# Patient Record
Sex: MALE | Race: WHITE
[De-identification: names, ages, dates, MRNs, and addresses within clinical notes are randomized per-mention and may not be internally consistent; named-entity substitution may affect disease eponyms.]

---

## 2018-04-05 ENCOUNTER — HOSPITAL ENCOUNTER (EMERGENCY)
Dept: HOSPITAL 95 - ER | Age: 53
Discharge: HOME | End: 2018-04-05
Payer: COMMERCIAL

## 2018-04-05 VITALS — WEIGHT: 178 LBS | HEIGHT: 69 IN | BODY MASS INDEX: 26.36 KG/M2

## 2018-04-05 DIAGNOSIS — Z88.5: ICD-10-CM

## 2018-04-05 DIAGNOSIS — E78.00: ICD-10-CM

## 2018-04-05 DIAGNOSIS — M54.5: ICD-10-CM

## 2018-04-05 DIAGNOSIS — Z79.899: ICD-10-CM

## 2018-04-05 DIAGNOSIS — Z88.8: ICD-10-CM

## 2018-04-05 DIAGNOSIS — E11.9: ICD-10-CM

## 2018-04-05 DIAGNOSIS — G89.29: Primary | ICD-10-CM

## 2018-04-05 DIAGNOSIS — Z88.1: ICD-10-CM

## 2018-04-05 DIAGNOSIS — Z87.891: ICD-10-CM

## 2018-04-05 DIAGNOSIS — Z79.4: ICD-10-CM

## 2018-04-21 ENCOUNTER — HOSPITAL ENCOUNTER (OUTPATIENT)
Dept: HOSPITAL 95 - LAB SHORT | Age: 53
End: 2018-04-21
Attending: PHYSICIAN ASSISTANT
Payer: COMMERCIAL

## 2018-04-21 DIAGNOSIS — L08.9: Primary | ICD-10-CM

## 2018-05-08 ENCOUNTER — HOSPITAL ENCOUNTER (INPATIENT)
Dept: HOSPITAL 95 - ER | Age: 53
LOS: 3 days | Discharge: HOME | DRG: 617 | End: 2018-05-11
Attending: HOSPITALIST | Admitting: HOSPITALIST
Payer: COMMERCIAL

## 2018-05-08 VITALS — HEIGHT: 69.02 IN | BODY MASS INDEX: 29.36 KG/M2 | WEIGHT: 198.2 LBS

## 2018-05-08 DIAGNOSIS — I10: ICD-10-CM

## 2018-05-08 DIAGNOSIS — M86.9: ICD-10-CM

## 2018-05-08 DIAGNOSIS — Z79.4: ICD-10-CM

## 2018-05-08 DIAGNOSIS — Z89.411: ICD-10-CM

## 2018-05-08 DIAGNOSIS — E78.5: ICD-10-CM

## 2018-05-08 DIAGNOSIS — E11.622: ICD-10-CM

## 2018-05-08 DIAGNOSIS — E11.42: ICD-10-CM

## 2018-05-08 DIAGNOSIS — K21.9: ICD-10-CM

## 2018-05-08 DIAGNOSIS — E11.69: Primary | ICD-10-CM

## 2018-05-08 DIAGNOSIS — N17.9: ICD-10-CM

## 2018-05-08 LAB
ALBUMIN SERPL BCP-MCNC: 3.6 G/DL (ref 3.4–5)
ALBUMIN/GLOB SERPL: 1 {RATIO} (ref 0.8–1.8)
ALT SERPL W P-5'-P-CCNC: 24 U/L (ref 12–78)
ANION GAP SERPL CALCULATED.4IONS-SCNC: 8 MMOL/L (ref 6–16)
AST SERPL W P-5'-P-CCNC: 19 U/L (ref 12–37)
BASOPHILS # BLD AUTO: 0.04 K/MM3 (ref 0–0.23)
BASOPHILS NFR BLD AUTO: 1 % (ref 0–2)
BILIRUB SERPL-MCNC: 0.3 MG/DL (ref 0.1–1)
BUN SERPL-MCNC: 48 MG/DL (ref 8–24)
CALCIUM SERPL-MCNC: 8.5 MG/DL (ref 8.5–10.1)
CHLORIDE SERPL-SCNC: 110 MMOL/L (ref 98–108)
CO2 SERPL-SCNC: 21 MMOL/L (ref 21–32)
CREAT SERPL-MCNC: 2.13 MG/DL (ref 0.6–1.2)
DEPRECATED RDW RBC AUTO: 39.7 FL (ref 35.1–46.3)
EOSINOPHIL # BLD AUTO: 0.15 K/MM3 (ref 0–0.68)
EOSINOPHIL NFR BLD AUTO: 2 % (ref 0–6)
ERYTHROCYTE [DISTWIDTH] IN BLOOD BY AUTOMATED COUNT: 12.5 % (ref 11.7–14.2)
GLOBULIN SER CALC-MCNC: 3.6 G/DL (ref 2.2–4)
GLUCOSE SERPL-MCNC: 220 MG/DL (ref 70–99)
HCT VFR BLD AUTO: 29.6 % (ref 37–53)
HGB BLD-MCNC: 9.8 G/DL (ref 13.5–17.5)
IMM GRANULOCYTES # BLD AUTO: 0.02 K/MM3 (ref 0–0.1)
IMM GRANULOCYTES NFR BLD AUTO: 0 % (ref 0–1)
LYMPHOCYTES # BLD AUTO: 1.05 K/MM3 (ref 0.84–5.2)
LYMPHOCYTES NFR BLD AUTO: 14 % (ref 21–46)
MCHC RBC AUTO-ENTMCNC: 33.1 G/DL (ref 31.5–36.5)
MCV RBC AUTO: 87 FL (ref 80–100)
MONOCYTES # BLD AUTO: 0.56 K/MM3 (ref 0.16–1.47)
MONOCYTES NFR BLD AUTO: 8 % (ref 4–13)
NEUTROPHILS # BLD AUTO: 5.61 K/MM3 (ref 1.96–9.15)
NEUTROPHILS NFR BLD AUTO: 76 % (ref 41–73)
NRBC # BLD AUTO: 0 K/MM3 (ref 0–0.02)
NRBC BLD AUTO-RTO: 0 /100 WBC (ref 0–0.2)
PLATELET # BLD AUTO: 256 K/MM3 (ref 150–400)
POTASSIUM SERPL-SCNC: 5.3 MMOL/L (ref 3.5–5.5)
PROT SERPL-MCNC: 7.2 G/DL (ref 6.4–8.2)
SODIUM SERPL-SCNC: 139 MMOL/L (ref 136–145)

## 2018-05-09 LAB
ANION GAP SERPL CALCULATED.4IONS-SCNC: 7 MMOL/L (ref 6–16)
BUN SERPL-MCNC: 43 MG/DL (ref 8–24)
CALCIUM SERPL-MCNC: 7.8 MG/DL (ref 8.5–10.1)
CHLORIDE SERPL-SCNC: 111 MMOL/L (ref 98–108)
CO2 SERPL-SCNC: 21 MMOL/L (ref 21–32)
CREAT SERPL-MCNC: 1.63 MG/DL (ref 0.6–1.2)
DEPRECATED RDW RBC AUTO: 39.5 FL (ref 35.1–46.3)
EOSINOPHIL #/AREA URNS HPF: 0 /[HPF]
EOSINOPHIL NFR URNS MANUAL: 0 % (ref 0–1)
ERYTHROCYTE [DISTWIDTH] IN BLOOD BY AUTOMATED COUNT: 12.4 % (ref 11.7–14.2)
GLUCOSE SERPL-MCNC: 197 MG/DL (ref 70–99)
HCT VFR BLD AUTO: 25.9 % (ref 37–53)
HGB BLD-MCNC: 8.6 G/DL (ref 13.5–17.5)
MCHC RBC AUTO-ENTMCNC: 33.2 G/DL (ref 31.5–36.5)
MCV RBC AUTO: 86 FL (ref 80–100)
NRBC # BLD AUTO: 0 K/MM3 (ref 0–0.02)
NRBC BLD AUTO-RTO: 0 /100 WBC (ref 0–0.2)
PLATELET # BLD AUTO: 194 K/MM3 (ref 150–400)
POTASSIUM SERPL-SCNC: 5.1 MMOL/L (ref 3.5–5.5)
SODIUM SERPL-SCNC: 139 MMOL/L (ref 136–145)
WBC #/AREA URNS HPF: (no result) /HPF (ref 0–5)

## 2018-05-10 LAB
ANION GAP SERPL CALCULATED.4IONS-SCNC: 6 MMOL/L (ref 6–16)
BASOPHILS # BLD AUTO: 0.05 K/MM3 (ref 0–0.23)
BASOPHILS NFR BLD AUTO: 1 % (ref 0–2)
BUN SERPL-MCNC: 25 MG/DL (ref 8–24)
CALCIUM SERPL-MCNC: 8.4 MG/DL (ref 8.5–10.1)
CHLORIDE SERPL-SCNC: 112 MMOL/L (ref 98–108)
CO2 SERPL-SCNC: 23 MMOL/L (ref 21–32)
CREAT SERPL-MCNC: 1.04 MG/DL (ref 0.6–1.2)
DEPRECATED RDW RBC AUTO: 37.7 FL (ref 35.1–46.3)
EOSINOPHIL # BLD AUTO: 0.19 K/MM3 (ref 0–0.68)
EOSINOPHIL NFR BLD AUTO: 3 % (ref 0–6)
ERYTHROCYTE [DISTWIDTH] IN BLOOD BY AUTOMATED COUNT: 12.2 % (ref 11.7–14.2)
GLUCOSE SERPL-MCNC: 216 MG/DL (ref 70–99)
HCT VFR BLD AUTO: 28.8 % (ref 37–53)
HGB BLD-MCNC: 9.8 G/DL (ref 13.5–17.5)
IMM GRANULOCYTES # BLD AUTO: 0.01 K/MM3 (ref 0–0.1)
IMM GRANULOCYTES NFR BLD AUTO: 0 % (ref 0–1)
LYMPHOCYTES # BLD AUTO: 1.1 K/MM3 (ref 0.84–5.2)
LYMPHOCYTES NFR BLD AUTO: 19 % (ref 21–46)
MCHC RBC AUTO-ENTMCNC: 34 G/DL (ref 31.5–36.5)
MCV RBC AUTO: 85 FL (ref 80–100)
MONOCYTES # BLD AUTO: 0.5 K/MM3 (ref 0.16–1.47)
MONOCYTES NFR BLD AUTO: 9 % (ref 4–13)
NEUTROPHILS # BLD AUTO: 4.05 K/MM3 (ref 1.96–9.15)
NEUTROPHILS NFR BLD AUTO: 69 % (ref 41–73)
NRBC # BLD AUTO: 0 K/MM3 (ref 0–0.02)
NRBC BLD AUTO-RTO: 0 /100 WBC (ref 0–0.2)
PLATELET # BLD AUTO: 187 K/MM3 (ref 150–400)
POTASSIUM SERPL-SCNC: 4.9 MMOL/L (ref 3.5–5.5)
SODIUM SERPL-SCNC: 141 MMOL/L (ref 136–145)

## 2018-05-10 PROCEDURE — 0Y6R0Z0 DETACHMENT AT RIGHT 2ND TOE, COMPLETE, OPEN APPROACH: ICD-10-PCS | Performed by: PODIATRIST

## 2018-05-10 PROCEDURE — 0JBQ0ZZ EXCISION OF RIGHT FOOT SUBCUTANEOUS TISSUE AND FASCIA, OPEN APPROACH: ICD-10-PCS | Performed by: PODIATRIST

## 2018-05-11 LAB
DEPRECATED RDW RBC AUTO: 37.5 FL (ref 35.1–46.3)
ERYTHROCYTE [DISTWIDTH] IN BLOOD BY AUTOMATED COUNT: 12.3 % (ref 11.7–14.2)
HCT VFR BLD AUTO: 28.3 % (ref 37–53)
HGB BLD-MCNC: 9.6 G/DL (ref 13.5–17.5)
MCHC RBC AUTO-ENTMCNC: 33.9 G/DL (ref 31.5–36.5)
MCV RBC AUTO: 84 FL (ref 80–100)
NRBC # BLD AUTO: 0 K/MM3 (ref 0–0.02)
NRBC BLD AUTO-RTO: 0 /100 WBC (ref 0–0.2)
PLATELET # BLD AUTO: 225 K/MM3 (ref 150–400)

## 2018-06-20 ENCOUNTER — HOSPITAL ENCOUNTER (OUTPATIENT)
Dept: HOSPITAL 95 - LAB EV | Age: 53
Discharge: HOME | End: 2018-06-20
Attending: PHYSICIAN ASSISTANT
Payer: COMMERCIAL

## 2018-06-20 DIAGNOSIS — R07.0: Primary | ICD-10-CM

## 2018-12-25 ENCOUNTER — HOSPITAL ENCOUNTER (EMERGENCY)
Dept: HOSPITAL 95 - ER | Age: 53
Discharge: HOME | End: 2018-12-25
Payer: COMMERCIAL

## 2018-12-25 VITALS — HEIGHT: 69 IN | BODY MASS INDEX: 28.14 KG/M2 | WEIGHT: 189.99 LBS

## 2018-12-25 DIAGNOSIS — Z79.4: ICD-10-CM

## 2018-12-25 DIAGNOSIS — E78.00: ICD-10-CM

## 2018-12-25 DIAGNOSIS — E11.9: ICD-10-CM

## 2018-12-25 DIAGNOSIS — Z79.899: ICD-10-CM

## 2018-12-25 DIAGNOSIS — I10: ICD-10-CM

## 2018-12-25 DIAGNOSIS — Z91.018: ICD-10-CM

## 2018-12-25 DIAGNOSIS — I16.0: Primary | ICD-10-CM

## 2018-12-25 DIAGNOSIS — Z87.891: ICD-10-CM

## 2019-02-26 ENCOUNTER — HOSPITAL ENCOUNTER (EMERGENCY)
Dept: HOSPITAL 95 - ER | Age: 54
Discharge: HOME | End: 2019-02-26
Payer: COMMERCIAL

## 2019-02-26 ENCOUNTER — HOSPITAL ENCOUNTER (EMERGENCY)
Dept: HOSPITAL 95 - ER | Age: 54
Discharge: LEFT BEFORE DISCHARGE | End: 2019-02-26
Payer: COMMERCIAL

## 2019-02-26 VITALS — HEIGHT: 66 IN | BODY MASS INDEX: 29.73 KG/M2 | WEIGHT: 184.99 LBS

## 2019-02-26 VITALS — WEIGHT: 184.99 LBS | HEIGHT: 66 IN | BODY MASS INDEX: 29.73 KG/M2

## 2019-02-26 DIAGNOSIS — Z91.018: ICD-10-CM

## 2019-02-26 DIAGNOSIS — Z88.5: ICD-10-CM

## 2019-02-26 DIAGNOSIS — R41.0: ICD-10-CM

## 2019-02-26 DIAGNOSIS — Z88.1: ICD-10-CM

## 2019-02-26 DIAGNOSIS — Z79.4: ICD-10-CM

## 2019-02-26 DIAGNOSIS — I10: ICD-10-CM

## 2019-02-26 DIAGNOSIS — Z79.899: ICD-10-CM

## 2019-02-26 DIAGNOSIS — E78.00: ICD-10-CM

## 2019-02-26 DIAGNOSIS — Z87.891: ICD-10-CM

## 2019-02-26 DIAGNOSIS — E11.9: ICD-10-CM

## 2019-02-26 DIAGNOSIS — Z88.8: ICD-10-CM

## 2019-02-26 DIAGNOSIS — R51: Primary | ICD-10-CM

## 2019-02-26 DIAGNOSIS — Z53.21: ICD-10-CM

## 2019-02-26 LAB
ALBUMIN SERPL BCP-MCNC: 2.6 G/DL (ref 3.4–5)
ALBUMIN/GLOB SERPL: 0.7 {RATIO} (ref 0.8–1.8)
ALT SERPL W P-5'-P-CCNC: 18 U/L (ref 12–78)
ANION GAP SERPL CALCULATED.4IONS-SCNC: 7 MMOL/L (ref 6–16)
AST SERPL W P-5'-P-CCNC: 18 U/L (ref 12–37)
BASOPHILS # BLD AUTO: 0.06 K/MM3 (ref 0–0.23)
BASOPHILS NFR BLD AUTO: 1 % (ref 0–2)
BILIRUB SERPL-MCNC: 0.3 MG/DL (ref 0.1–1)
BUN SERPL-MCNC: 43 MG/DL (ref 8–24)
CALCIUM SERPL-MCNC: 8.7 MG/DL (ref 8.5–10.1)
CHLORIDE SERPL-SCNC: 112 MMOL/L (ref 98–108)
CO2 SERPL-SCNC: 24 MMOL/L (ref 21–32)
CREAT SERPL-MCNC: 2.62 MG/DL (ref 0.6–1.2)
DEPRECATED RDW RBC AUTO: 41.6 FL (ref 35.1–46.3)
EOSINOPHIL # BLD AUTO: 0.13 K/MM3 (ref 0–0.68)
EOSINOPHIL NFR BLD AUTO: 3 % (ref 0–6)
ERYTHROCYTE [DISTWIDTH] IN BLOOD BY AUTOMATED COUNT: 13.6 % (ref 11.7–14.2)
GLOBULIN SER CALC-MCNC: 3.7 G/DL (ref 2.2–4)
GLUCOSE SERPL-MCNC: 261 MG/DL (ref 70–99)
HCT VFR BLD AUTO: 32.7 % (ref 37–53)
HGB BLD-MCNC: 10.7 G/DL (ref 13.5–17.5)
IMM GRANULOCYTES # BLD AUTO: 0.01 K/MM3 (ref 0–0.1)
IMM GRANULOCYTES NFR BLD AUTO: 0 % (ref 0–1)
LYMPHOCYTES # BLD AUTO: 1.03 K/MM3 (ref 0.84–5.2)
LYMPHOCYTES NFR BLD AUTO: 20 % (ref 21–46)
MCHC RBC AUTO-ENTMCNC: 32.7 G/DL (ref 31.5–36.5)
MCV RBC AUTO: 84 FL (ref 80–100)
MONOCYTES # BLD AUTO: 0.28 K/MM3 (ref 0.16–1.47)
MONOCYTES NFR BLD AUTO: 5 % (ref 4–13)
NEUTROPHILS # BLD AUTO: 3.69 K/MM3 (ref 1.96–9.15)
NEUTROPHILS NFR BLD AUTO: 71 % (ref 41–73)
NRBC # BLD AUTO: 0 K/MM3 (ref 0–0.02)
NRBC BLD AUTO-RTO: 0 /100 WBC (ref 0–0.2)
PLATELET # BLD AUTO: 232 K/MM3 (ref 150–400)
POTASSIUM SERPL-SCNC: 4.6 MMOL/L (ref 3.5–5.5)
PROT SERPL-MCNC: 6.3 G/DL (ref 6.4–8.2)
PROTHROMBIN TIME: 10.1 SEC (ref 9.7–11.5)
SODIUM SERPL-SCNC: 143 MMOL/L (ref 136–145)

## 2019-04-23 ENCOUNTER — HOSPITAL ENCOUNTER (OUTPATIENT)
Dept: HOSPITAL 95 - LAB EV | Age: 54
Discharge: HOME | End: 2019-04-23
Attending: PHYSICIAN ASSISTANT
Payer: COMMERCIAL

## 2019-04-23 ENCOUNTER — HOSPITAL ENCOUNTER (INPATIENT)
Dept: HOSPITAL 95 - ER | Age: 54
LOS: 7 days | Discharge: HOME | DRG: 682 | End: 2019-04-30
Attending: HOSPITALIST | Admitting: HOSPITALIST
Payer: COMMERCIAL

## 2019-04-23 VITALS — WEIGHT: 181.88 LBS | BODY MASS INDEX: 26.94 KG/M2 | HEIGHT: 69.02 IN

## 2019-04-23 DIAGNOSIS — M86.671: ICD-10-CM

## 2019-04-23 DIAGNOSIS — Z87.891: ICD-10-CM

## 2019-04-23 DIAGNOSIS — R73.9: Primary | ICD-10-CM

## 2019-04-23 DIAGNOSIS — N18.3: ICD-10-CM

## 2019-04-23 DIAGNOSIS — E87.1: ICD-10-CM

## 2019-04-23 DIAGNOSIS — Z79.82: ICD-10-CM

## 2019-04-23 DIAGNOSIS — E11.42: ICD-10-CM

## 2019-04-23 DIAGNOSIS — G92: ICD-10-CM

## 2019-04-23 DIAGNOSIS — E78.5: ICD-10-CM

## 2019-04-23 DIAGNOSIS — D64.9: ICD-10-CM

## 2019-04-23 DIAGNOSIS — E86.0: ICD-10-CM

## 2019-04-23 DIAGNOSIS — E11.22: ICD-10-CM

## 2019-04-23 DIAGNOSIS — E86.1: ICD-10-CM

## 2019-04-23 DIAGNOSIS — N17.9: Primary | ICD-10-CM

## 2019-04-23 DIAGNOSIS — E87.3: ICD-10-CM

## 2019-04-23 DIAGNOSIS — I12.9: ICD-10-CM

## 2019-04-23 DIAGNOSIS — E87.5: ICD-10-CM

## 2019-04-23 DIAGNOSIS — Z79.4: ICD-10-CM

## 2019-04-23 DIAGNOSIS — L97.519: ICD-10-CM

## 2019-04-23 DIAGNOSIS — H93.8X3: ICD-10-CM

## 2019-04-23 DIAGNOSIS — K21.9: ICD-10-CM

## 2019-04-23 DIAGNOSIS — I80.8: ICD-10-CM

## 2019-04-23 DIAGNOSIS — E83.39: ICD-10-CM

## 2019-04-23 DIAGNOSIS — R20.2: ICD-10-CM

## 2019-04-23 DIAGNOSIS — E11.621: ICD-10-CM

## 2019-04-23 DIAGNOSIS — E11.65: ICD-10-CM

## 2019-04-23 LAB
ALBUMIN SERPL BCP-MCNC: 2.8 G/DL (ref 3.4–5)
ALBUMIN/GLOB SERPL: 0.7 {RATIO} (ref 0.8–1.8)
ALT SERPL W P-5'-P-CCNC: 23 U/L (ref 12–78)
ANION GAP SERPL CALCULATED.4IONS-SCNC: 7 MMOL/L
ANION GAP SERPL CALCULATED.4IONS-SCNC: 7 MMOL/L
ANION GAP SERPL CALCULATED.4IONS-SCNC: 9 MMOL/L (ref 6–16)
AST SERPL W P-5'-P-CCNC: 13 U/L (ref 12–37)
BASOPHILS # BLD AUTO: 0.1 K/MM3 (ref 0–0.23)
BASOPHILS NFR BLD AUTO: 1 % (ref 0–2)
BILIRUB SERPL-MCNC: 0.3 MG/DL (ref 0.1–1)
BUN SERPL-MCNC: 71 MG/DL
BUN SERPL-MCNC: 74 MG/DL
BUN SERPL-MCNC: 78 MG/DL (ref 8–24)
CALCIUM SERPL-MCNC: 8.2 MG/DL
CALCIUM SERPL-MCNC: 8.4 MG/DL (ref 8.5–10.1)
CALCIUM SERPL-MCNC: 8.5 MG/DL
CHLORIDE SERPL-SCNC: 103 MMOL/L (ref 98–108)
CHLORIDE SERPL-SCNC: 112 MMOL/L
CHLORIDE SERPL-SCNC: 113 MMOL/L
CO2 SERPL-SCNC: 20 MMOL/L
CO2 SERPL-SCNC: 20 MMOL/L
CO2 SERPL-SCNC: 20 MMOL/L (ref 21–32)
CREAT SERPL-MCNC: 3.69 MG/DL
CREAT SERPL-MCNC: 3.76 MG/DL
CREAT SERPL-MCNC: 4.14 MG/DL (ref 0.6–1.2)
CREAT UR-MCNC: 65.9 MG/DL
DEPRECATED RDW RBC AUTO: 40.3 FL (ref 35.1–46.3)
EOSINOPHIL # BLD AUTO: 0.22 K/MM3 (ref 0–0.68)
EOSINOPHIL NFR BLD AUTO: 3 % (ref 0–6)
ERYTHROCYTE [DISTWIDTH] IN BLOOD BY AUTOMATED COUNT: 13.1 % (ref 11.7–14.2)
GLOBULIN SER CALC-MCNC: 4.2 G/DL (ref 2.2–4)
GLUCOSE SERPL-MCNC: 214 MG/DL
GLUCOSE SERPL-MCNC: 251 MG/DL
GLUCOSE SERPL-MCNC: 354 MG/DL (ref 70–99)
GLUCOSE UR-MCNC: (no result) MG/DL
HCT VFR BLD AUTO: 30.8 % (ref 37–53)
HGB BLD-MCNC: 10.7 G/DL (ref 13.5–17.5)
IMM GRANULOCYTES # BLD AUTO: 0.07 K/MM3 (ref 0–0.1)
IMM GRANULOCYTES NFR BLD AUTO: 1 % (ref 0–1)
LYMPHOCYTES # BLD AUTO: 1.5 K/MM3 (ref 0.84–5.2)
LYMPHOCYTES NFR BLD AUTO: 20 % (ref 21–46)
MCHC RBC AUTO-ENTMCNC: 34.7 G/DL (ref 31.5–36.5)
MCV RBC AUTO: 84 FL (ref 80–100)
MONOCYTES # BLD AUTO: 0.43 K/MM3 (ref 0.16–1.47)
MONOCYTES NFR BLD AUTO: 6 % (ref 4–13)
NEUTROPHILS # BLD AUTO: 5.15 K/MM3 (ref 1.96–9.15)
NEUTROPHILS NFR BLD AUTO: 69 % (ref 41–73)
NRBC # BLD AUTO: 0 K/MM3 (ref 0–0.02)
NRBC BLD AUTO-RTO: 0 /100 WBC (ref 0–0.2)
PH BLDV: 7.24 [PH]
PLATELET # BLD AUTO: 331 K/MM3 (ref 150–400)
POTASSIUM SERPL-SCNC: 4.9 MMOL/L
POTASSIUM SERPL-SCNC: 5.2 MMOL/L
POTASSIUM SERPL-SCNC: 6.5 MMOL/L (ref 3.5–5.5)
PROT SERPL-MCNC: 7 G/DL (ref 6.4–8.2)
PROT UR STRIP-MCNC: (no result) MG/DL
RBC #/AREA URNS HPF: (no result) /HPF
SODIUM SERPL-SCNC: 132 MMOL/L (ref 136–145)
SODIUM SERPL-SCNC: 139 MMOL/L
SODIUM SERPL-SCNC: 140 MMOL/L
SODIUM UR-SCNC: 51 MMOL/L
SP GR SPEC: 1.01
TROPONIN I SERPL-MCNC: <0.015 NG/ML
UROBILINOGEN UR STRIP-MCNC: (no result) MG/DL
WBC #/AREA URNS HPF: (no result) /HPF

## 2019-04-24 LAB
ANION GAP SERPL CALCULATED.4IONS-SCNC: 6 MMOL/L
ANION GAP SERPL CALCULATED.4IONS-SCNC: 7 MMOL/L
BUN SERPL-MCNC: 68 MG/DL
BUN SERPL-MCNC: 72 MG/DL
CALCIUM SERPL-MCNC: 7.8 MG/DL
CALCIUM SERPL-MCNC: 8.1 MG/DL
CHLORIDE SERPL-SCNC: 112 MMOL/L
CHLORIDE SERPL-SCNC: 115 MMOL/L
CO2 SERPL-SCNC: 20 MMOL/L
CO2 SERPL-SCNC: 21 MMOL/L
CREAT SERPL-MCNC: 3.55 MG/DL
CREAT SERPL-MCNC: 3.58 MG/DL
DEPRECATED RDW RBC AUTO: 40.6 FL
ERYTHROCYTE [DISTWIDTH] IN BLOOD BY AUTOMATED COUNT: 13.1 %
GLUCOSE SERPL-MCNC: 139 MG/DL
GLUCOSE SERPL-MCNC: 224 MG/DL
HCT VFR BLD AUTO: 26.8 %
HGB BLD-MCNC: 8.9 G/DL
MAGNESIUM SERPL-MCNC: 2.5 MG/DL
MCHC RBC AUTO-ENTMCNC: 33.2 G/DL
MCV RBC AUTO: 86 FL
NRBC # BLD AUTO: 0 K/MM3
NRBC BLD AUTO-RTO: 0 /100 WBC
PH BLDV: 7.41 [PH]
PLATELET # BLD AUTO: 239 K/MM3
POTASSIUM SERPL-SCNC: 4.7 MMOL/L
POTASSIUM SERPL-SCNC: 4.9 MMOL/L
SODIUM SERPL-SCNC: 139 MMOL/L
SODIUM SERPL-SCNC: 142 MMOL/L

## 2019-04-25 LAB
ALBUMIN SERPL BCP-MCNC: 2 G/DL
ANION GAP SERPL CALCULATED.4IONS-SCNC: 5 MMOL/L
BASOPHILS # BLD AUTO: 0.06 K/MM3
BASOPHILS NFR BLD AUTO: 1 %
BUN SERPL-MCNC: 68 MG/DL
CALCIUM SERPL-MCNC: 7.8 MG/DL
CHLORIDE SERPL-SCNC: 111 MMOL/L
CO2 SERPL-SCNC: 24 MMOL/L
CREAT SERPL-MCNC: 3.39 MG/DL
DEPRECATED RDW RBC AUTO: 40.5 FL
EOSINOPHIL # BLD AUTO: 0.23 K/MM3
EOSINOPHIL NFR BLD AUTO: 4 %
ERYTHROCYTE [DISTWIDTH] IN BLOOD BY AUTOMATED COUNT: 12.9 %
FERRITIN SERPL-MCNC: 325 NG/ML
GLUCOSE SERPL-MCNC: 208 MG/DL
GLUCOSE SERPL-MCNC: 607 MG/DL
HCT VFR BLD AUTO: 24.3 %
HGB BLD-MCNC: 8.3 G/DL
IMM GRANULOCYTES # BLD AUTO: 0.03 K/MM3
IMM GRANULOCYTES NFR BLD AUTO: 1 %
LYMPHOCYTES # BLD AUTO: 1.93 K/MM3
LYMPHOCYTES NFR BLD AUTO: 34 %
MCHC RBC AUTO-ENTMCNC: 34.2 G/DL
MCV RBC AUTO: 86 FL
MONOCYTES # BLD AUTO: 0.4 K/MM3
MONOCYTES NFR BLD AUTO: 7 %
NEUTROPHILS # BLD AUTO: 3.03 K/MM3
NEUTROPHILS NFR BLD AUTO: 53 %
NRBC # BLD AUTO: 0 K/MM3
NRBC BLD AUTO-RTO: 0 /100 WBC
PHOSPHATE SERPL-MCNC: 5.1 MG/DL
PLATELET # BLD AUTO: 217 K/MM3
POTASSIUM SERPL-SCNC: 4.5 MMOL/L
SODIUM SERPL-SCNC: 140 MMOL/L
TIBC SERPL-MCNC: 189 UG/DL

## 2019-04-26 LAB
ALBUMIN SERPL BCP-MCNC: 2.4 G/DL
ANION GAP SERPL CALCULATED.4IONS-SCNC: 9 MMOL/L
BASOPHILS # BLD AUTO: 0.02 K/MM3
BASOPHILS NFR BLD AUTO: 0 %
BUN SERPL-MCNC: 77 MG/DL
CALCIUM SERPL-MCNC: 8.7 MG/DL
CHLORIDE SERPL-SCNC: 101 MMOL/L
CO2 SERPL-SCNC: 20 MMOL/L
CREAT SERPL-MCNC: 3.29 MG/DL
DEPRECATED RDW RBC AUTO: 38.4 FL
EOSINOPHIL # BLD AUTO: 0 K/MM3
EOSINOPHIL NFR BLD AUTO: 0 %
ERYTHROCYTE [DISTWIDTH] IN BLOOD BY AUTOMATED COUNT: 12.3 %
GLUCOSE SERPL-MCNC: 433 MG/DL
GLUCOSE SERPL-MCNC: 546 MG/DL
HCT VFR BLD AUTO: 27.9 %
HGB BLD-MCNC: 9.5 G/DL
IMM GRANULOCYTES # BLD AUTO: 0.08 K/MM3
IMM GRANULOCYTES NFR BLD AUTO: 1 %
LYMPHOCYTES # BLD AUTO: 0.62 K/MM3
LYMPHOCYTES NFR BLD AUTO: 4 %
MAGNESIUM SERPL-MCNC: 2.5 MG/DL
MCHC RBC AUTO-ENTMCNC: 34.1 G/DL
MCV RBC AUTO: 85 FL
MONOCYTES # BLD AUTO: 0.21 K/MM3
MONOCYTES NFR BLD AUTO: 2 %
NEUTROPHILS # BLD AUTO: 13.25 K/MM3
NEUTROPHILS NFR BLD AUTO: 93 %
NRBC # BLD AUTO: 0 K/MM3
NRBC BLD AUTO-RTO: 0 /100 WBC
PHOSPHATE SERPL-MCNC: 4.1 MG/DL
PLATELET # BLD AUTO: 244 K/MM3
POTASSIUM SERPL-SCNC: 5.1 MMOL/L
SODIUM SERPL-SCNC: 130 MMOL/L
URATE SERPL-MCNC: 7.1 MG/DL

## 2019-04-27 LAB
ALBUMIN SERPL BCP-MCNC: 2.4 G/DL
ANION GAP SERPL CALCULATED.4IONS-SCNC: 10 MMOL/L
BUN SERPL-MCNC: 85 MG/DL
CALCIUM SERPL-MCNC: 8.9 MG/DL
CHLORIDE SERPL-SCNC: 97 MMOL/L
CO2 SERPL-SCNC: 22 MMOL/L
CREAT SERPL-MCNC: 3.03 MG/DL
GLUCOSE SERPL-MCNC: 449 MG/DL
HCT VFR BLD AUTO: 26.3 %
HGB BLD-MCNC: 9.1 G/DL
MAGNESIUM SERPL-MCNC: 2.4 MG/DL
PHOSPHATE SERPL-MCNC: 5.3 MG/DL
POTASSIUM SERPL-SCNC: 5 MMOL/L
SODIUM SERPL-SCNC: 129 MMOL/L

## 2019-04-28 LAB
ALBUMIN SERPL BCP-MCNC: 2.2 G/DL
ANION GAP SERPL CALCULATED.4IONS-SCNC: 9 MMOL/L
BASOPHILS # BLD AUTO: 0.06 K/MM3
BASOPHILS NFR BLD AUTO: 1 %
BUN SERPL-MCNC: 84 MG/DL
CALCIUM SERPL-MCNC: 8.7 MG/DL
CHLORIDE SERPL-SCNC: 104 MMOL/L
CO2 SERPL-SCNC: 22 MMOL/L
CREAT SERPL-MCNC: 3.08 MG/DL
DEPRECATED RDW RBC AUTO: 38.7 FL
EOSINOPHIL # BLD AUTO: 0.35 K/MM3
EOSINOPHIL NFR BLD AUTO: 4 %
ERYTHROCYTE [DISTWIDTH] IN BLOOD BY AUTOMATED COUNT: 12.6 %
GLUCOSE SERPL-MCNC: 216 MG/DL
HCT VFR BLD AUTO: 25.8 %
HGB BLD-MCNC: 8.8 G/DL
IMM GRANULOCYTES # BLD AUTO: 0.25 K/MM3
IMM GRANULOCYTES NFR BLD AUTO: 3 %
LYMPHOCYTES # BLD AUTO: 2.49 K/MM3
LYMPHOCYTES NFR BLD AUTO: 29 %
MCHC RBC AUTO-ENTMCNC: 34.1 G/DL
MCV RBC AUTO: 85 FL
MONOCYTES # BLD AUTO: 0.6 K/MM3
MONOCYTES NFR BLD AUTO: 7 %
NEUTROPHILS # BLD AUTO: 4.88 K/MM3
NEUTROPHILS NFR BLD AUTO: 56 %
NRBC # BLD AUTO: 0 K/MM3
NRBC BLD AUTO-RTO: 0 /100 WBC
PHOSPHATE SERPL-MCNC: 6.1 MG/DL
PLATELET # BLD AUTO: 194 K/MM3
POTASSIUM SERPL-SCNC: 4.7 MMOL/L
SODIUM SERPL-SCNC: 135 MMOL/L
TSH SERPL DL<=0.005 MIU/L-ACNC: 3.92 UIU/ML

## 2019-04-29 LAB
ALBUMIN SERPL BCP-MCNC: 2.2 G/DL
ANION GAP SERPL CALCULATED.4IONS-SCNC: 8 MMOL/L
BUN SERPL-MCNC: 72 MG/DL
CALCIUM SERPL-MCNC: 8.5 MG/DL
CHLORIDE SERPL-SCNC: 106 MMOL/L
CO2 SERPL-SCNC: 22 MMOL/L
CREAT SERPL-MCNC: 2.89 MG/DL
GLUCOSE SERPL-MCNC: 293 MG/DL
HCT VFR BLD AUTO: 28.3 %
HGB BLD-MCNC: 9.5 G/DL
MAGNESIUM SERPL-MCNC: 2.4 MG/DL
PHOSPHATE SERPL-MCNC: 6.1 MG/DL
POTASSIUM SERPL-SCNC: 4.8 MMOL/L
SODIUM SERPL-SCNC: 136 MMOL/L

## 2019-04-30 LAB
ALBUMIN SERPL BCP-MCNC: 2.2 G/DL
ANION GAP SERPL CALCULATED.4IONS-SCNC: 7 MMOL/L
BUN SERPL-MCNC: 68 MG/DL
CALCIUM SERPL-MCNC: 8.8 MG/DL
CHLORIDE SERPL-SCNC: 109 MMOL/L
CO2 SERPL-SCNC: 23 MMOL/L
CREAT SERPL-MCNC: 2.78 MG/DL
GLUCOSE SERPL-MCNC: 140 MG/DL
HCT VFR BLD AUTO: 27.3 %
HGB BLD-MCNC: 9.1 G/DL
MAGNESIUM SERPL-MCNC: 2.2 MG/DL
PHOSPHATE SERPL-MCNC: 5.6 MG/DL
POTASSIUM SERPL-SCNC: 4.7 MMOL/L
SODIUM SERPL-SCNC: 139 MMOL/L

## 2020-12-26 ENCOUNTER — HOSPITAL ENCOUNTER (INPATIENT)
Dept: HOSPITAL 95 - ER | Age: 55
LOS: 2 days | Discharge: ANOTHER HOSPITAL | DRG: 280 | End: 2020-12-28
Attending: INTERNAL MEDICINE | Admitting: INTERNAL MEDICINE
Payer: MEDICARE

## 2020-12-26 VITALS — BODY MASS INDEX: 26.54 KG/M2 | HEIGHT: 70 IN | WEIGHT: 185.41 LBS

## 2020-12-26 DIAGNOSIS — I21.4: Primary | ICD-10-CM

## 2020-12-26 DIAGNOSIS — E88.09: ICD-10-CM

## 2020-12-26 DIAGNOSIS — Z87.891: ICD-10-CM

## 2020-12-26 DIAGNOSIS — Z79.899: ICD-10-CM

## 2020-12-26 DIAGNOSIS — E78.5: ICD-10-CM

## 2020-12-26 DIAGNOSIS — E11.40: ICD-10-CM

## 2020-12-26 DIAGNOSIS — J44.9: ICD-10-CM

## 2020-12-26 DIAGNOSIS — E11.22: ICD-10-CM

## 2020-12-26 DIAGNOSIS — E83.39: ICD-10-CM

## 2020-12-26 DIAGNOSIS — E87.70: ICD-10-CM

## 2020-12-26 DIAGNOSIS — G25.81: ICD-10-CM

## 2020-12-26 DIAGNOSIS — I50.20: ICD-10-CM

## 2020-12-26 DIAGNOSIS — Z20.828: ICD-10-CM

## 2020-12-26 DIAGNOSIS — I25.10: ICD-10-CM

## 2020-12-26 DIAGNOSIS — N18.6: ICD-10-CM

## 2020-12-26 DIAGNOSIS — Z79.4: ICD-10-CM

## 2020-12-26 DIAGNOSIS — Z99.2: ICD-10-CM

## 2020-12-26 DIAGNOSIS — Z88.8: ICD-10-CM

## 2020-12-26 DIAGNOSIS — I08.3: ICD-10-CM

## 2020-12-26 DIAGNOSIS — D64.9: ICD-10-CM

## 2020-12-26 DIAGNOSIS — I13.2: ICD-10-CM

## 2020-12-26 LAB
ALBUMIN SERPL BCP-MCNC: 2.4 G/DL (ref 3.4–5)
ALBUMIN/GLOB SERPL: 0.5 {RATIO} (ref 0.8–1.8)
ALT SERPL W P-5'-P-CCNC: 19 U/L (ref 12–78)
ANION GAP SERPL CALCULATED.4IONS-SCNC: 8 MMOL/L (ref 6–16)
AST SERPL W P-5'-P-CCNC: 17 U/L (ref 12–37)
BASOPHILS # BLD AUTO: 0.07 K/MM3 (ref 0–0.23)
BASOPHILS NFR BLD AUTO: 1 % (ref 0–2)
BILIRUB SERPL-MCNC: 0.4 MG/DL (ref 0.1–1)
BUN SERPL-MCNC: 42 MG/DL (ref 8–24)
CALCIUM SERPL-MCNC: 8.9 MG/DL (ref 8.5–10.1)
CHLORIDE SERPL-SCNC: 97 MMOL/L (ref 98–108)
CHOLEST SERPL-MCNC: 176 MG/DL (ref 50–200)
CHOLEST/HDLC SERPL: 3.5 {RATIO}
CO2 SERPL-SCNC: 32 MMOL/L (ref 21–32)
CREAT SERPL-MCNC: 4.88 MG/DL (ref 0.6–1.2)
DEPRECATED RDW RBC AUTO: 47.9 FL (ref 35.1–46.3)
EOSINOPHIL # BLD AUTO: 0.15 K/MM3 (ref 0–0.68)
EOSINOPHIL NFR BLD AUTO: 3 % (ref 0–6)
ERYTHROCYTE [DISTWIDTH] IN BLOOD BY AUTOMATED COUNT: 14.5 % (ref 11.7–14.2)
GLOBULIN SER CALC-MCNC: 4.5 G/DL (ref 2.2–4)
GLUCOSE SERPL-MCNC: 80 MG/DL (ref 70–99)
HCT VFR BLD AUTO: 35.5 % (ref 37–53)
HDLC SERPL-MCNC: 50 MG/DL (ref 39–?)
HGB BLD-MCNC: 10.8 G/DL (ref 13.5–17.5)
IMM GRANULOCYTES # BLD AUTO: 0.01 K/MM3 (ref 0–0.1)
IMM GRANULOCYTES NFR BLD AUTO: 0 % (ref 0–1)
LDLC SERPL CALC-MCNC: 106 MG/DL (ref 0–110)
LDLC/HDLC SERPL: 2.1 {RATIO}
LYMPHOCYTES # BLD AUTO: 0.63 K/MM3 (ref 0.84–5.2)
LYMPHOCYTES NFR BLD AUTO: 11 % (ref 21–46)
MCHC RBC AUTO-ENTMCNC: 30.4 G/DL (ref 31.5–36.5)
MCV RBC AUTO: 92 FL (ref 80–100)
MONOCYTES # BLD AUTO: 0.48 K/MM3 (ref 0.16–1.47)
MONOCYTES NFR BLD AUTO: 8 % (ref 4–13)
NEUTROPHILS # BLD AUTO: 4.66 K/MM3 (ref 1.96–9.15)
NEUTROPHILS NFR BLD AUTO: 78 % (ref 41–73)
NRBC # BLD AUTO: 0 K/MM3 (ref 0–0.02)
NRBC BLD AUTO-RTO: 0 /100 WBC (ref 0–0.2)
PLATELET # BLD AUTO: 314 K/MM3 (ref 150–400)
POTASSIUM SERPL-SCNC: 4 MMOL/L (ref 3.5–5.5)
PROT SERPL-MCNC: 6.9 G/DL (ref 6.4–8.2)
SODIUM SERPL-SCNC: 137 MMOL/L (ref 136–145)
TRIGL SERPL-MCNC: 98 MG/DL (ref 30–160)
TROPONIN I SERPL-MCNC: 0.11 NG/ML (ref 0–0.04)
VLDLC SERPL CALC-MCNC: 19 MG/DL (ref 6–32)

## 2020-12-26 PROCEDURE — C1894 INTRO/SHEATH, NON-LASER: HCPCS

## 2020-12-26 PROCEDURE — 3E1M39Z IRRIGATION OF PERITONEAL CAVITY USING DIALYSATE, PERCUTANEOUS APPROACH: ICD-10-PCS | Performed by: SPECIALIST

## 2020-12-26 PROCEDURE — C1887 CATHETER, GUIDING: HCPCS

## 2020-12-26 PROCEDURE — G0378 HOSPITAL OBSERVATION PER HR: HCPCS

## 2020-12-26 PROCEDURE — C1769 GUIDE WIRE: HCPCS

## 2020-12-26 NOTE — NUR
1755 PT ARRIVED TO MEDICAL FLOOR FROM ER VIA W/C. PT TRANSFERED TO BED
INDEPENDENTLY. PT DENIES CP AND SOB. DR. CORRALES NOTIFIED OF CONSULT, HE
REQUESTED DIALYSIS RN BE CALLED IN TO SET PT UP WITH PD TONIGHT. AMAN MORENO, ANNAMARIA
NOTIFIED OF DR. CORRALES'S ORDERS.

## 2020-12-26 NOTE — NUR
DIALYSIS-PD
CONNECTED TO PD TX PER PROTOCAL. 2 X 2.5% DEXTROSE DIANEAL 6L BAGS WITH 30U OF
INSULIN. 8HRS 50 MIN, [id number] ML TOTAL VOLUME, 2000ML DWELL VOLUME, 5 EXCHANGES,
1 HR 20 MIN DWELL TIME, 0 LAST FILL. SITE CLEAR BUT ALITTLE CRUSTY, CLEANED
AND DRESSED. TALKED TO ANNAMARIA BECK,

## 2020-12-27 LAB
ALBUMIN SERPL BCP-MCNC: 2.2 G/DL (ref 3.4–5)
ANION GAP SERPL CALCULATED.4IONS-SCNC: 8 MMOL/L (ref 6–16)
BASOPHILS # BLD AUTO: 0.07 K/MM3 (ref 0–0.23)
BASOPHILS NFR BLD AUTO: 1 % (ref 0–2)
BUN SERPL-MCNC: 45 MG/DL (ref 8–24)
CALCIUM SERPL-MCNC: 8.6 MG/DL (ref 8.5–10.1)
CHLORIDE SERPL-SCNC: 96 MMOL/L (ref 98–108)
CO2 SERPL-SCNC: 33 MMOL/L (ref 21–32)
CREAT SERPL-MCNC: 5.75 MG/DL (ref 0.6–1.2)
DEPRECATED RDW RBC AUTO: 48.3 FL (ref 35.1–46.3)
EOSINOPHIL # BLD AUTO: 0.27 K/MM3 (ref 0–0.68)
EOSINOPHIL NFR BLD AUTO: 5 % (ref 0–6)
ERYTHROCYTE [DISTWIDTH] IN BLOOD BY AUTOMATED COUNT: 14.5 % (ref 11.7–14.2)
GLUCOSE SERPL-MCNC: 198 MG/DL (ref 70–99)
HCT VFR BLD AUTO: 33.9 % (ref 37–53)
HGB BLD-MCNC: 10.2 G/DL (ref 13.5–17.5)
IMM GRANULOCYTES # BLD AUTO: 0.03 K/MM3 (ref 0–0.1)
IMM GRANULOCYTES NFR BLD AUTO: 1 % (ref 0–1)
LYMPHOCYTES # BLD AUTO: 0.78 K/MM3 (ref 0.84–5.2)
LYMPHOCYTES NFR BLD AUTO: 13 % (ref 21–46)
MAGNESIUM SERPL-MCNC: 2.3 MG/DL (ref 1.6–2.4)
MCHC RBC AUTO-ENTMCNC: 30.1 G/DL (ref 31.5–36.5)
MCV RBC AUTO: 91 FL (ref 80–100)
MONOCYTES # BLD AUTO: 0.32 K/MM3 (ref 0.16–1.47)
MONOCYTES NFR BLD AUTO: 5 % (ref 4–13)
NEUTROPHILS # BLD AUTO: 4.46 K/MM3 (ref 1.96–9.15)
NEUTROPHILS NFR BLD AUTO: 75 % (ref 41–73)
NRBC # BLD AUTO: 0 K/MM3 (ref 0–0.02)
NRBC BLD AUTO-RTO: 0 /100 WBC (ref 0–0.2)
PHOSPHATE SERPL-MCNC: 5.8 MG/DL (ref 2.5–4.9)
PLATELET # BLD AUTO: 340 K/MM3 (ref 150–400)
POTASSIUM SERPL-SCNC: 4 MMOL/L (ref 3.5–5.5)
SODIUM SERPL-SCNC: 137 MMOL/L (ref 136–145)
TROPONIN I SERPL-MCNC: 0.09 NG/ML (ref 0–0.04)

## 2020-12-27 PROCEDURE — 3E1M39Z IRRIGATION OF PERITONEAL CAVITY USING DIALYSATE, PERCUTANEOUS APPROACH: ICD-10-PCS | Performed by: SPECIALIST

## 2020-12-27 NOTE — NUR
SHIFT SUMMARY
 
LYING IN SEMI FOWLERS WITH EYES CLOSED.  AAO X4 WITH CONFUSION NOTED AT TIMES.
 ASSISTED WITH AMBULATION TO COMMODE SEVERAL TIMES THIS SHIFT.  PT HAS
UNSTEADY GAIT AND REQUIRES FWW AND GAIT BELT FOR STABILITY.  BEDSIDE PD
ONGOING THROUGH SHIFT, NO ALARMS NOTED.  DENIES FURTHER NEEDS OR WANTS AT THIS
TIME.  SAFETY MEASURES IN PLACE.  WILL CONTINUE TO MONITOR AND GIVE HAND OFF
TO ONCOMING SHIFT USING SBAR.

## 2020-12-27 NOTE — NUR
SHIFT SUMMARY.
A&OX4, CBA WITH FWW TO BATHRROOM, PT HAS UNSTEADY GAIT AND LOOSES BALANCE
EASILY. PT DENIES PAIN, SOB, N/V. GOOD MEAL INTAKE. ECHO COMPLETED THIS
AFTERNOON, DR. ADAMS CONSULTED BY DR. WRIGHT AS ECHO WAS ABNORMAL. DR. ADAMS
EVALUATED PT AND PT IS TO HAVE ANGIOGRAM TOMORROW, PT TO BE NPO AFTER
MIDNIGHT, PT AWARE. PT RECIEVING SHOWER WITH ASSITANCE AT THIS TIME. PT TO
RECIEVE PD DURING THE NIGHT. NO OTHER CHANGES OR CONCERNS.

## 2020-12-27 NOTE — NUR
DIALYSIS-PD
0730 PT C/O H/A. T 99.8. PD FLUID SLIGHTLY CLOUDY. PT ALERT AND ORIENTED. ID [id number]
ML.  ML. TX DC'ED PER PROTOCAL.

## 2020-12-27 NOTE — NUR
DIALYSIS-PD
PT JUST COMPLETED A SHOWER. CHANGED HD PERMACATH DRESSING, SITE CLEAR. CHANGED
THE PD CATH DRESSING WITH ANTIBIOTIC OINTMENT PER PROTOCAL. CONNECTED PT TO TX
WITH 2-6LITER BAGS OF 2.5% DEXTROSE DIANEAL BAGS. REGULAR INSULIN 30U PER BAG.
CONNECTED AT 1945. PT APPEARS TO FEEL BETTER THAN LAST NIGHT.

## 2020-12-28 LAB
ALBUMIN SERPL BCP-MCNC: 1.9 G/DL (ref 3.4–5)
ANION GAP SERPL CALCULATED.4IONS-SCNC: 9 MMOL/L (ref 6–16)
BUN SERPL-MCNC: 55 MG/DL (ref 8–24)
CALCIUM SERPL-MCNC: 9 MG/DL (ref 8.5–10.1)
CHLORIDE SERPL-SCNC: 95 MMOL/L (ref 98–108)
CO2 SERPL-SCNC: 30 MMOL/L (ref 21–32)
CREAT SERPL-MCNC: 5.75 MG/DL (ref 0.6–1.2)
GLUCOSE SERPL-MCNC: 248 MG/DL (ref 70–99)
HCT VFR BLD AUTO: 31.6 % (ref 37–53)
HGB BLD-MCNC: 10 G/DL (ref 13.5–17.5)
MAGNESIUM SERPL-MCNC: 2.3 MG/DL (ref 1.6–2.4)
PHOSPHATE SERPL-MCNC: 6.3 MG/DL (ref 2.5–4.9)
POTASSIUM SERPL-SCNC: 4.2 MMOL/L (ref 3.5–5.5)
PROTHROMBIN TIME: 10.7 SEC (ref 9.7–11.5)
SODIUM SERPL-SCNC: 134 MMOL/L (ref 136–145)

## 2020-12-28 PROCEDURE — 4A023N7 MEASUREMENT OF CARDIAC SAMPLING AND PRESSURE, LEFT HEART, PERCUTANEOUS APPROACH: ICD-10-PCS | Performed by: INTERNAL MEDICINE

## 2020-12-28 PROCEDURE — B2111ZZ FLUOROSCOPY OF MULTIPLE CORONARY ARTERIES USING LOW OSMOLAR CONTRAST: ICD-10-PCS | Performed by: INTERNAL MEDICINE

## 2020-12-28 PROCEDURE — 5A1D70Z PERFORMANCE OF URINARY FILTRATION, INTERMITTENT, LESS THAN 6 HOURS PER DAY: ICD-10-PCS | Performed by: SPECIALIST

## 2020-12-28 NOTE — NUR
PT DENIES CP WITH NITRO DRIP @ 80 MCG/MIN. HEPARIN DRIP CONTINUES @ 13
UNITS/KG/MIN. REPORT GIVEN TO RADHA-PARAMEDIC. PHONED ANNAMARIA MENDZE @
[facility] & NOTIFIED THAT PT IS IN TRANSIT AND ALSO, RN MADE AWARE THAT
HEPARIN DRIP HAS BEEN INITIATED, THAT PT NOW PAINFREE WITH NITRO DRIP @
80MCG/MIN. RN ALSO MADE AWARE OF HYPOGLYCEMIC EPISODE AND THAT 1 AMP OF D50
GIVEN WITH RE-CHECK .

## 2020-12-28 NOTE — NUR
PT AWAKE / ALERT / ORIENTED THIS AM.  NO VERB DISCOMFORT.
OVERNIGHT CCPD COMPLETE.  POOR UF NOTED NOTED.  25 ML TOTAL
PT AESEPTICALLY DISCONNECTED AND CAPPED.  CYCLER STRIPPED AND CLEANED.
DR CORRALES CONSULTED FOR NEW ORDERS / PLAN OF CARE.

## 2020-12-28 NOTE — NUR
PT DENIES NAUSEA AFTER BEING MEDICATED WITH ZOFRAN. ABLE TO TAKE APRESOLINE PO
WITH SIP OF WATER. PT REMAINS HYPERTENSIVE AND CP 7/10 DESPITE NITRO DRIP @ 50
MCG/MIN. DR. ADAMS OFFICE CALLED TO UPDATE. PT/PTT PENDING. PT TO BE
TRANSFERED TO [facility]. WILL UPDATE DR. ADAMS AND THEN CALL REPORT AND FOR
TRANSPORT [facility]IA [facility].

## 2020-12-28 NOTE — NUR
CBG 46 PT GIVEN 2 CRANBERRY JUICE AND JERAMIE CRACKERS. PT STILL REPORTING 3/10
CHEST PAIN WITH NITRO DRIP @ 75 MCG/MIN.

## 2020-12-28 NOTE — NUR
CONSENT FOR TRANSFER OBTAINED BY ANNAMARIA RODRIGEZ. PT CONTINUES TO REPORTS 7/10 CP
AND IS CLUTCHING HIS CHEST-NITRO TITRATED UP TO 60 MCG/MIN.

## 2020-12-28 NOTE — NUR
TO CATH LAB AT 10AM FOR ANGIOGRAM. THE PLAN IS TO DIALIZE AFTERWARD. HE HAS
MOSTLY SLEPT THIS MORNING AND BEEN NPO. LONG ACTING INSULIN HELD. SC HEPARIN
ALSO HELD. TELE NSR.HE HAS DENIED PAIN THIS MORNING. NO CP. WILL GO TO  ICU
11 POST PROCEDURE.

## 2020-12-28 NOTE — NUR
TR BAND
 
TR BAND DEFLATED AND REMOVED AT 1500. CLOTH DOT DRESSING APPLIED AND ARMBOARD
SECURED AROUND WRIST. REPORT GIVEN TO LETY YIN.

## 2020-12-28 NOTE — NUR
ARRIVAL TO ICU
 
1118 - PT ARRIVES TO ICU AT THIS TIME. HE IS AWAKE BUT IS DROWSY AND SLOW TO
RESPOND. NO PAIN AT THIS TIME. NSR, HR 60S. BP WNL. PTS WIFE UPDATED ON PHONE
BY RN. TR BAND SECURED ON R WRIST; RADIAL PULSE PALPABLE. SECURED IN ARMBOARD.
WILL CONTINUE MONITORING PT AND WRIST. PREPARING FOR TRANSFER FOR BYPASS
SURGERY.

## 2020-12-28 NOTE — NUR
ASSUMED PT CARE UPON RETURN FOR HEMODIALYSIS. PT A&0X4-REPORTS 5/10 LEFT CHEST
PAIN AND IS CLUTCHING HIS CHEST. O2 @ 2 LITERS N/C PLACED. ECG SHOWS SR-BP
ELEVATED-SEE FLOWSHEET FOR FREQUENT VS. LUNGS DIMINISHED IN THE BASES.
SATS>90% ON RA. NO NOTED JVD. ABDOMEN DISTENDED AND MEPILEX DRESSING C/D/I TO
PERITONEAL DIALYSIS CATH. DR. ADAMS CONTACTED REGARDING CP-ORDERS GIVEN TO
INITIATE NITRO AND HEPARIN DRIPS. DISCUSSED WITH PT TRANSFER TO [facility] FOR
CABG-PT IS Zoroastrian AND REFUSES BLOOD PRODUCTS, BUT IS OK WITH
"HESPAN AND OTHER NON BLOOD PRODUCTS." RIGHT RADIAL SITE WITH TR BAND IN
PLACE, BUT AIR REMOVED BY ANNAMARIA SHORE.

## 2020-12-28 NOTE — NUR
DR. ADAMS NOTIFIED THAT PT IS STILL HAVING CP DESPITE NITRO DRIP @ 60 MCG/MIN.
NO NEW ORDERS-[facility]PORT [facility]

## 2020-12-28 NOTE — NUR
SHIFT SUMMARY
 
LYING IN SEMI FOWLERS WITH EYES CLOSED.  AAO X4 WITH CONFUSION NOTED AT TIMES.
 PT HAS UNSTEADY GAIT AND REQUIRES FWW AND GAIT BELT FOR STABILITY.  BEDSIDE
PD ONGOING THROUGH SHIFT, NO ALARMS NOTED.  HAS BEEN NPO PAST [facility]
C[facility] THIS AM.  DENIES FURTHER NEEDS OR WANTS AT THIS TIME.  SAFETY MEASURES IN
PLACE.  WILL CONTINUE TO MONITOR AND GIVE HAND OFF TO ONCOMING SHIFT USING
SBAR.